# Patient Record
Sex: FEMALE | Race: WHITE | HISPANIC OR LATINO
[De-identification: names, ages, dates, MRNs, and addresses within clinical notes are randomized per-mention and may not be internally consistent; named-entity substitution may affect disease eponyms.]

---

## 2019-11-14 ENCOUNTER — RECORD ABSTRACTING (OUTPATIENT)
Age: 59
End: 2019-11-14

## 2019-11-14 DIAGNOSIS — A15.9 RESPIRATORY TUBERCULOSIS UNSPECIFIED: ICD-10-CM

## 2019-11-14 DIAGNOSIS — E03.9 HYPOTHYROIDISM, UNSPECIFIED: ICD-10-CM

## 2019-11-14 DIAGNOSIS — I87.2 VENOUS INSUFFICIENCY (CHRONIC) (PERIPHERAL): ICD-10-CM

## 2019-11-14 DIAGNOSIS — I73.9 PERIPHERAL VASCULAR DISEASE, UNSPECIFIED: ICD-10-CM

## 2019-11-14 DIAGNOSIS — Z00.00 ENCOUNTER FOR GENERAL ADULT MEDICAL EXAMINATION W/OUT ABNORMAL FINDINGS: ICD-10-CM

## 2019-12-05 ENCOUNTER — APPOINTMENT (OUTPATIENT)
Dept: NEPHROLOGY | Facility: CLINIC | Age: 59
End: 2019-12-05
Payer: COMMERCIAL

## 2019-12-05 VITALS
SYSTOLIC BLOOD PRESSURE: 108 MMHG | DIASTOLIC BLOOD PRESSURE: 68 MMHG | BODY MASS INDEX: 27.6 KG/M2 | WEIGHT: 150 LBS | HEART RATE: 80 BPM | HEIGHT: 62 IN | RESPIRATION RATE: 16 BRPM | OXYGEN SATURATION: 96 %

## 2019-12-05 DIAGNOSIS — Z78.9 OTHER SPECIFIED HEALTH STATUS: ICD-10-CM

## 2019-12-05 DIAGNOSIS — G89.29 OTHER CHRONIC PAIN: ICD-10-CM

## 2019-12-05 PROCEDURE — 99214 OFFICE O/P EST MOD 30 MIN: CPT

## 2019-12-05 RX ORDER — PREDNISONE 10 MG/1
10 TABLET ORAL
Refills: 0 | Status: DISCONTINUED | COMMUNITY
End: 2019-12-05

## 2019-12-05 RX ORDER — GABAPENTIN 300 MG
300 TABLET ORAL 3 TIMES DAILY
Refills: 0 | Status: DISCONTINUED | COMMUNITY
End: 2019-12-05

## 2019-12-05 NOTE — ASSESSMENT
[FreeTextEntry1] : Sara Chin is a 59-year-old female with a history of hypertension, nephrectomy due to tuberculosis, hypothyroidism, and vitamin D deficiency who is here regarding her renal function. Her blood pressures are been well controlled. She had a renal ultrasound in June 2014 which demonstrated an 11 cm right kidney with normal contour and appearance. Her most recent renal ultrasound (06/08/2018) demonstrated an 11.5 cm right kidney with normal echotexture. Her BUN/creatinine levels have trended as follows: 21/0.71 (10/22/2015) --> 16/0.85 (07/01/2016) --> 16/0.73 (10/10/2016) -->11/0.86 (05/10/2017) --> 15/0.7 (09/06/2017) --> 15/0.85 (11/20/2017),\par 13/0.92 (08/28/2018) --> 12/0.78 (11/23/2019). The urinalysis of 11/23/2019 is without blood or albumin on dipstick.  There is a small amount of leukocyte esterase though no bacteria.  . \par \par \par IMPRESSION:\par \par (1) Edema. Lower extremities are bandaged.  I am unsure of what the other vascular issues are.\par (2) Low blood pressure.  Resolved.  The blood pressure is good. \par (3) Lethargy. \par (4) Lower extremity pain. This has been keeping Ms. Chin up at nights.  I am unsure if this is related to  spinal stenosis, peripheral vascular disease, or another etiology.  Ms. Chin has been seeing Dr. Gleason of vascular surgery. \par (5) Stage I CKD. Solitary kidney with a normal serum creatinine.\par (6) Significant sweating at nights. I am note sure what to make of this.\par \par RECOMMENDATIONS:\par \par (1) The lower extremity pain is Ms. Chin' main complaint.  Although I am uncomfortable in treating her with NSAIDS given the solitary kidney, I am also concerned about placing her on narcotics for her pain.  In the short term I told her it was okay to take Aleve at nights for significant pain.  If this is not sufficient, you might consider adding a small dose of tramadol.  I prescribed a one week supply of tramadol.   Ms. Chin might require pain management involvement.  She will be seeing Dr. Gleason next week. \par \par (2) Suggest checking the renal function monthly as long as Ms. Chin is taking NSAIDS.\par \par (3) Increase vitamin D to 2000 IU once daily. \par \par (4) Return in 6 months with a CMP, CBC, phosphorous, 25 hydroxy vitamin D, and iPTH.

## 2019-12-05 NOTE — REVIEW OF SYSTEMS
[Lower Ext Edema] : lower extremity edema [Negative] : Heme/Lymph [FreeTextEntry9] : lower extremity pain

## 2019-12-05 NOTE — CONSULT LETTER
[Dear  ___] : Dear  [unfilled], [Please see my note below.] : Please see my note below. [Consult Letter:] : I had the pleasure of evaluating your patient, [unfilled]. [Consult Closing:] : Thank you very much for allowing me to participate in the care of this patient.  If you have any questions, please do not hesitate to contact me. [Sincerely,] : Sincerely, [FreeTextEntry3] : Dez Montemayor [DrTeresa  ___] : Dr. SENIOR

## 2019-12-05 NOTE — PHYSICAL EXAM
[General Appearance - Alert] : alert [Neck Appearance] : the appearance of the neck was normal [] : no respiratory distress [Respiration, Rhythm And Depth] : normal respiratory rhythm and effort [Exaggerated Use Of Accessory Muscles For Inspiration] : no accessory muscle use [Auscultation Breath Sounds / Voice Sounds] : lungs were clear to auscultation bilaterally [Heart Rate And Rhythm] : heart rate was normal and rhythm regular [Heart Sounds] : normal S1 and S2 [Murmurs] : no murmurs [Heart Sounds Gallop] : no gallops [Heart Sounds Pericardial Friction Rub] : no pericardial rub [Bowel Sounds] : normal bowel sounds [Abdomen Tenderness] : non-tender [Abdomen Soft] : soft [No CVA Tenderness] : no ~M costovertebral angle tenderness [No Spinal Tenderness] : no spinal tenderness [No Focal Deficits] : no focal deficits [Involuntary Movements] : no involuntary movements were seen [Oriented To Time, Place, And Person] : oriented to person, place, and time [Impaired Insight] : insight and judgment were intact [Affect] : the affect was normal [Mood] : the mood was normal [FreeTextEntry1] : lower extremities bandaged

## 2019-12-05 NOTE — HISTORY OF PRESENT ILLNESS
[FreeTextEntry1] : Yoko Chin is a 59-year-old female who was initially referred for a serum creatinine of 1.3 mg/dL.\par Her past medical history is outlined below. She has a history of a solitary kidney following nephrectomy for tuberculosis of the kidney in 2000. She underwent treatment for the tuberculosis at the time.\par \par Ms. Chin is here for follow up. She has been having problems with her lower extremity varicosities.  She has been seeing Dr. Gleason of vascular surgery.  She reports that she has been feeling very tired.

## 2019-12-24 ENCOUNTER — APPOINTMENT (OUTPATIENT)
Dept: NEPHROLOGY | Facility: CLINIC | Age: 59
End: 2019-12-24

## 2020-06-08 ENCOUNTER — APPOINTMENT (OUTPATIENT)
Dept: NEPHROLOGY | Facility: CLINIC | Age: 60
End: 2020-06-08
Payer: COMMERCIAL

## 2020-06-08 DIAGNOSIS — F41.9 ANXIETY DISORDER, UNSPECIFIED: ICD-10-CM

## 2020-06-08 DIAGNOSIS — G47.00 INSOMNIA, UNSPECIFIED: ICD-10-CM

## 2020-06-08 DIAGNOSIS — K21.9 GASTRO-ESOPHAGEAL REFLUX DISEASE W/OUT ESOPHAGITIS: ICD-10-CM

## 2020-06-08 PROCEDURE — 99214 OFFICE O/P EST MOD 30 MIN: CPT | Mod: 95

## 2020-06-08 RX ORDER — PREDNISONE 10 MG/1
10 TABLET ORAL DAILY
Refills: 0 | Status: DISCONTINUED | COMMUNITY
End: 2020-06-08

## 2020-06-08 RX ORDER — TRAMADOL HYDROCHLORIDE 50 MG/1
50 TABLET, COATED ORAL 3 TIMES DAILY
Qty: 21 | Refills: 0 | Status: DISCONTINUED | COMMUNITY
Start: 2019-12-05 | End: 2020-06-08

## 2020-06-08 RX ORDER — GABAPENTIN 300 MG/1
300 CAPSULE ORAL 3 TIMES DAILY
Refills: 0 | Status: DISCONTINUED | COMMUNITY
End: 2020-06-08

## 2020-06-08 RX ORDER — LOSARTAN POTASSIUM 25 MG/1
25 TABLET, FILM COATED ORAL DAILY
Refills: 0 | Status: DISCONTINUED | COMMUNITY
End: 2020-06-08

## 2020-06-08 NOTE — CONSULT LETTER
[Dear  ___] : Dear  [unfilled], [Consult Letter:] : I had the pleasure of evaluating your patient, [unfilled]. [Please see my note below.] : Please see my note below. [Consult Closing:] : Thank you very much for allowing me to participate in the care of this patient.  If you have any questions, please do not hesitate to contact me. [Sincerely,] : Sincerely, [FreeTextEntry3] : Dez Montemayor

## 2020-06-08 NOTE — ASSESSMENT
[FreeTextEntry1] : Sara Chin is a 60-year-old female with a history of hypertension, nephrectomy due to renal tuberculosis, hypothyroidism, and vitamin D deficiency who is here regarding her renal function.  She had a renal ultrasound in June 2014 which demonstrated an 11 cm right kidney with normal contour and appearance. Her most recent renal ultrasound (06/08/2018) demonstrated an 11.5 cm right kidney with normal echotexture. Her BUN/creatinine levels have trended as follows: 21/0.71 (10/22/2015) --> 16/0.85 (07/01/2016) --> 16/0.73 (10/10/2016) -->11/0.86 (05/10/2017) --> 15/0.7 (09/06/2017) --> 15/0.85 (11/20/2017), 13/0.92 (08/28/2018) --> 12/0.78 (11/23/2019) --> 15/0.73 (06/03/2020). The urinalysis of 11/23/2019 is without blood or albumin on dipstick.  \par \par \par IMPRESSION:\par \par (1) Edema. This was noted on the prior visit.  Ms. Chin is to follow up with Dr. Gleason of vascular.\par (2) GERD.\par (3) Insomnia\par (4) Normal renal function\par (5) Vitamin D deficiency. The recent 25 hydroxy vitamin D level was 40.4 ng/mL (normal)\par (6) Anxiety.\par \par RECOMMENDATIONS:\par \par (1) GERD.  Trial of omeprazole 20 mg PO qHS. Ms. Chin should see GI.\par (2) Insomnia.  I suspect that the reflux is contributing to this.\par (3) Anxiety.  Start Lexapro 10 mg PO once daily.  Follow up with PCP re: anxiety. \par (4) Stage I CKD. Solitary kidney with a normal serum creatinine.\par (6) Significant sweating at nights. I am note sure what to make of this.\par \par I will see Ms. Nelson again in one year.  \par \par

## 2020-06-08 NOTE — REVIEW OF SYSTEMS
[Lower Ext Edema] : lower extremity edema [As Noted in HPI] : as noted in HPI [Negative] : Integumentary [FreeTextEntry9] : lower extremity pain  and swelling

## 2020-06-08 NOTE — HISTORY OF PRESENT ILLNESS
[Home] : at home, [unfilled] , at the time of the visit. [Medical Office: ([facility])___] : at the medical office located in  [Verbal consent obtained from patient] : the patient, [unfilled] [FreeTextEntry4] : Chaya Clifton [FreeTextEntry1] : \par Yoko Chin is a 60-year-old female who was initially referred for a serum creatinine of 1.3 mg/dL.\par Her past medical history is outlined below. She has a history of a solitary kidney following nephrectomy for tuberculosis of the kidney in 2000. She underwent treatment for the tuberculosis at the time.\par \par I am visiting with Ms. Chin today via telehealth video conference. .   She has seen  Dr. Gleason of vascular surgery because of her lower extremity edema.   She is planning on seeing him again in July 2020 for a lower extremity doppler.  She saw cardiology for chest pain (Dr. Iyer).  She was told she is okay.  She reports that she wore a cardiac monitor. She is waiting for these results.  She was told she might have reflux and was given Gaviscon.  The symptoms improved with Gaviscon.  A physician covering for her PCP gave her mirtazipine 15 mg PO qHS for sleep. It gave her a bad taste.  She stopped it and started taking melatonin.  When she becomes anxious she takes hydroxyzine.

## 2020-09-10 ENCOUNTER — RX RENEWAL (OUTPATIENT)
Age: 60
End: 2020-09-10

## 2020-10-10 ENCOUNTER — RX RENEWAL (OUTPATIENT)
Age: 60
End: 2020-10-10

## 2021-09-14 ENCOUNTER — APPOINTMENT (OUTPATIENT)
Dept: NEPHROLOGY | Facility: CLINIC | Age: 61
End: 2021-09-14
Payer: COMMERCIAL

## 2021-09-14 VITALS
BODY MASS INDEX: 26.87 KG/M2 | TEMPERATURE: 98 F | OXYGEN SATURATION: 97 % | SYSTOLIC BLOOD PRESSURE: 102 MMHG | HEART RATE: 81 BPM | WEIGHT: 146 LBS | RESPIRATION RATE: 16 BRPM | DIASTOLIC BLOOD PRESSURE: 64 MMHG | HEIGHT: 62 IN

## 2021-09-14 PROCEDURE — 99072 ADDL SUPL MATRL&STAF TM PHE: CPT

## 2021-09-14 PROCEDURE — 99214 OFFICE O/P EST MOD 30 MIN: CPT

## 2021-09-14 RX ORDER — ALPRAZOLAM 0.5 MG/1
0.5 TABLET ORAL
Qty: 6 | Refills: 0 | Status: DISCONTINUED | COMMUNITY
Start: 2020-01-23 | End: 2021-09-14

## 2021-09-14 RX ORDER — HYDROXYZINE HYDROCHLORIDE 25 MG/1
25 TABLET ORAL AT BEDTIME
Refills: 0 | Status: DISCONTINUED | COMMUNITY
End: 2021-09-14

## 2021-09-14 RX ORDER — ESCITALOPRAM OXALATE 10 MG/1
10 TABLET ORAL DAILY
Qty: 30 | Refills: 0 | Status: DISCONTINUED | COMMUNITY
Start: 2020-06-08 | End: 2021-09-14

## 2021-09-14 RX ORDER — MIRTAZAPINE 15 MG/1
15 TABLET, FILM COATED ORAL
Qty: 30 | Refills: 0 | Status: DISCONTINUED | COMMUNITY
Start: 2020-05-20 | End: 2021-09-14

## 2021-09-14 RX ORDER — SILVER SULFADIAZINE 10 G/1000G
1 CREAM TOPICAL
Qty: 50 | Refills: 0 | Status: DISCONTINUED | COMMUNITY
Start: 2019-09-13 | End: 2021-09-14

## 2021-09-14 RX ORDER — ASPIRIN 81 MG
81 TABLET, DELAYED RELEASE (ENTERIC COATED) ORAL DAILY
Refills: 0 | Status: ACTIVE | COMMUNITY

## 2021-09-14 RX ORDER — PREDNISONE 5 MG/1
5 TABLET ORAL
Qty: 21 | Refills: 0 | Status: DISCONTINUED | COMMUNITY
Start: 2020-01-10 | End: 2021-09-14

## 2021-09-14 RX ORDER — TRIAMCINOLONE ACETONIDE 0.25 MG/ML
0.03 LOTION TOPICAL
Qty: 60 | Refills: 0 | Status: DISCONTINUED | COMMUNITY
Start: 2020-02-03 | End: 2021-09-14

## 2021-09-14 NOTE — PHYSICAL EXAM
[General Appearance - Alert] : alert [General Appearance - In No Acute Distress] : in no acute distress [Extraocular Movements] : extraocular movements were intact [Sclera] : the sclera and conjunctiva were normal [Neck Appearance] : the appearance of the neck was normal [] : no respiratory distress [Respiration, Rhythm And Depth] : normal respiratory rhythm and effort [Heart Rate And Rhythm] : heart rate was normal and rhythm regular [Heart Sounds] : normal S1 and S2 [Heart Sounds Gallop] : no gallops [Murmurs] : no murmurs [Heart Sounds Pericardial Friction Rub] : no pericardial rub [Bowel Sounds] : normal bowel sounds [FreeTextEntry1] : bilateral LE 1+ pitting edema [Abdomen Soft] : soft [Abdomen Tenderness] : non-tender [Abnormal Walk] : normal gait [Involuntary Movements] : no involuntary movements were seen [No Focal Deficits] : no focal deficits [Oriented To Time, Place, And Person] : oriented to person, place, and time [Impaired Insight] : insight and judgment were intact [Affect] : the affect was normal [Mood] : the mood was normal

## 2021-09-14 NOTE — HISTORY OF PRESENT ILLNESS
Family notified: Dr. Khalil to update family.   [FreeTextEntry1] : Yoko Chin is a 61-year-old female who was initially referred for a serum creatinine of 1.3 mg/dL.\par Her past medical history is outlined below. She has a history of a solitary kidney following nephrectomy for tuberculosis of the kidney in 2000. She underwent treatment for the tuberculosis at the time.\par \par I last saw Ms. Chin in June of 2020.  At that time she stated that she was planning on seeing Dr. Gleason of vascular surgery because of her lower extremity edema.   She had also seen Dr. Iyer of cardiology for chest pain.  She stated that she was told she is okay.\par \par Ms. Chin is here for follow up. She reports that she had surgery on the right lower extremity.  She feels well and is without complaints.

## 2021-09-14 NOTE — ASSESSMENT
[FreeTextEntry1] : Sara Chin is a 62-year-old female with a history of hypertension, nephrectomy due to renal tuberculosis, hypothyroidism, and vitamin D deficiency who is here regarding her renal function.  She had a renal ultrasound in June 2014 which demonstrated an 11 cm right kidney with normal contour and appearance. Her most recent renal ultrasound (06/08/2018) demonstrated an 11.5 cm right kidney with normal echotexture. Her BUN/creatinine levels have trended as follows: 21/0.71 (10/22/2015) --> 16/0.85 (07/01/2016) --> 16/0.73 (10/10/2016) -->11/0.86 (05/10/2017) --> 15/0.7 (09/06/2017) --> 15/0.85 (11/20/2017), 13/0.92 (08/28/2018) --> 12/0.78 (11/23/2019) --> 15/0.73 (06/03/2020) --> 21/0.97 (08/17/2021).  The urine albumin to creatinine ratio is 3 mcg per mg (normal). \par \par \par IMPRESSION:\par \par (1) Edema. \par (2) Normal renal function.  The renal function is better than when I first saw Ms. Chin.  The serum creatinine has increased slightly since her last visit with me.  She has been using Voltaren cream (an NSAID). \par (3) Vitamin D deficiency. The recent 25 hydroxy vitamin D level was 30 ng/mL.\par (4) Hyperparathyroidism (90 pg/mL).  I suspect this is secondary hyperparathyroidism and related to the borderline 25 hydroxy vitamin D level. \par (5) Hematuria.  The urinalysis demonstrated trace occult blood with 0-2 RBC per high power field.  Ms. Chin reports this has been going on for years.  She has never seen a urologist. \par (6) Hypertension. Well controlled. \par \par RECOMMENDATIONS:\par \par (1) Increase vitamin D to 2000 IU once daily.\par (2) Stop using Voltaren cream.  Avoid using all NSAIDS.  Okay to use Tylenol. \par (3) Continue to stay well hydrated.\par (4) Repeat urinalysis.  Consider a urology visit. \par (5) Continue losartan 50 mg PO once daily. \par \par I will see Ms. Nelson again in 6 months. \par \par

## 2021-09-14 NOTE — REVIEW OF SYSTEMS
[As Noted in HPI] : as noted in HPI [Lower Ext Edema] : lower extremity edema [Negative] : Heme/Lymph [FreeTextEntry8] : nocturia x 2 [FreeTextEntry9] : pain in bilateral hands (pointing to DIP joints).  Right shoulder pain for the past 1 month

## 2022-03-17 DIAGNOSIS — E87.1 HYPO-OSMOLALITY AND HYPONATREMIA: ICD-10-CM

## 2022-04-26 ENCOUNTER — APPOINTMENT (OUTPATIENT)
Dept: NEPHROLOGY | Facility: CLINIC | Age: 62
End: 2022-04-26

## 2022-07-25 ENCOUNTER — APPOINTMENT (OUTPATIENT)
Dept: NEPHROLOGY | Facility: CLINIC | Age: 62
End: 2022-07-25

## 2022-07-25 VITALS
HEART RATE: 76 BPM | DIASTOLIC BLOOD PRESSURE: 70 MMHG | TEMPERATURE: 98 F | SYSTOLIC BLOOD PRESSURE: 110 MMHG | OXYGEN SATURATION: 98 %

## 2022-07-25 DIAGNOSIS — M54.31 SCIATICA, RIGHT SIDE: ICD-10-CM

## 2022-07-25 PROCEDURE — 99214 OFFICE O/P EST MOD 30 MIN: CPT

## 2022-07-25 NOTE — HISTORY OF PRESENT ILLNESS
[FreeTextEntry1] : Yoko Chin is a 62-year-old female who was initially referred for a serum creatinine of 1.3 mg/dL.\par Her past medical history is outlined below. She has a history of a solitary kidney following nephrectomy for tuberculosis of the kidney in 2000. She underwent treatment for the tuberculosis at the time.\par \par I last saw Ms. Chin in September 2021.  Ms. Chin is here for follow up. She has been having sciatica and took 2 Celebrex on a single day.

## 2022-07-25 NOTE — ASSESSMENT
[FreeTextEntry1] : Sara Chin is a 62-year-old female with a history of hypertension, nephrectomy due to renal tuberculosis, hypothyroidism, and vitamin D deficiency who is here regarding her renal function.  She had a renal ultrasound in June 2014 which demonstrated an 11 cm right kidney with normal contour and appearance. Her most recent renal ultrasound (06/08/2018) demonstrated an 11.5 cm right kidney with normal echotexture. Her BUN/creatinine levels have trended as follows: 21/0.71 (10/22/2015) --> 16/0.85 (07/01/2016) --> 16/0.73 (10/10/2016) -->11/0.86 (05/10/2017) --> 15/0.7 (09/06/2017) --> 15/0.85 (11/20/2017), 13/0.92 (08/28/2018) --> 12/0.78 (11/23/2019) --> 15/0.73 (06/03/2020) --> 21/0.97 (08/17/2021) --> 14/0.75 (03/22/2022),   The urine albumin to creatinine ratio is 3 mcg per mg (normal). \par \par \par IMPRESSION/RECOMMENDATIONS:\par \par (1) Sciatica\par -Okay to take Celebrex for no longer than 5 days\par \par (2) Edema\par -Take furosemide 10 mg PO every other day\par \par (3) Normal renal function.  \par \par (4) Vitamin D deficiency. I don't have a recent 25 hydroxy vitamin D level.\par -Suggest checking a 25 hydroxy vitamin D level and treat as needed\par \par (5) Hyperparathyroidism (90 pg/mL).  I suspect this is secondary hyperparathyroidism and related to the borderline 25 hydroxy vitamin D level. \par \par (5) Hematuria.  The urinalysis demonstrated trace occult blood with 0-2 RBC per high power field.  Ms. Chin reports this has been going on for years.  She has never seen a urologist. \par -Suggest urology visit\par \par (6) Hypertension. Well controlled. \par -Continue losartan\par \par Dr. Kang.  Please follow up with Ms. Chin.  She will see me in the future at your request. \par \par \par \par

## 2022-07-25 NOTE — REVIEW OF SYSTEMS
[As Noted in HPI] : as noted in HPI [Lower Ext Edema] : lower extremity edema [Negative] : Heme/Lymph [FreeTextEntry8] : nocturia x 2 [FreeTextEntry9] : right buttock pain with radiations down right leg

## 2022-11-02 ENCOUNTER — RX RENEWAL (OUTPATIENT)
Age: 62
End: 2022-11-02

## 2022-11-17 ENCOUNTER — APPOINTMENT (OUTPATIENT)
Dept: NEPHROLOGY | Facility: CLINIC | Age: 62
End: 2022-11-17

## 2023-06-19 ENCOUNTER — APPOINTMENT (OUTPATIENT)
Dept: NEPHROLOGY | Facility: CLINIC | Age: 63
End: 2023-06-19
Payer: COMMERCIAL

## 2023-06-19 VITALS
HEIGHT: 62 IN | WEIGHT: 147 LBS | HEART RATE: 74 BPM | DIASTOLIC BLOOD PRESSURE: 76 MMHG | SYSTOLIC BLOOD PRESSURE: 118 MMHG | TEMPERATURE: 97.4 F | BODY MASS INDEX: 27.05 KG/M2 | RESPIRATION RATE: 16 BRPM | OXYGEN SATURATION: 97 %

## 2023-06-19 DIAGNOSIS — E55.9 VITAMIN D DEFICIENCY, UNSPECIFIED: ICD-10-CM

## 2023-06-19 DIAGNOSIS — I10 ESSENTIAL (PRIMARY) HYPERTENSION: ICD-10-CM

## 2023-06-19 DIAGNOSIS — L23.7 ALLERGIC CONTACT DERMATITIS DUE TO PLANTS, EXCEPT FOOD: ICD-10-CM

## 2023-06-19 PROCEDURE — 99214 OFFICE O/P EST MOD 30 MIN: CPT

## 2023-06-19 RX ORDER — CHLORHEXIDINE GLUCONATE 4 %
5 LIQUID (ML) TOPICAL DAILY
Refills: 0 | Status: DISCONTINUED | COMMUNITY
End: 2023-06-19

## 2023-06-19 RX ORDER — CELECOXIB 100 MG/1
100 CAPSULE ORAL
Qty: 30 | Refills: 0 | Status: DISCONTINUED | COMMUNITY
Start: 2022-07-12 | End: 2023-06-19

## 2023-06-19 NOTE — ASSESSMENT
[FreeTextEntry1] : Sara Chin is a 62-year-old female with a history of hypertension, nephrectomy due to renal tuberculosis, hypothyroidism, and vitamin D deficiency who is here regarding her renal function.  She had a renal ultrasound in June 2014 which demonstrated an 11 cm right kidney with normal contour and appearance. Her most recent renal ultrasound (06/08/2018) demonstrated an 11.5 cm right kidney with normal echotexture. Her BUN/creatinine levels have trended as follows: 21/0.71 (10/22/2015) --> 16/0.85 (07/01/2016) --> 16/0.73 (10/10/2016) -->11/0.86 (05/10/2017) --> 15/0.7 (09/06/2017) --> 15/0.85 (11/20/2017), 13/0.92 (08/28/2018) --> 12/0.78 (11/23/2019) --> 15/0.73 (06/03/2020) --> 21/0.97 (08/17/2021) --> 14/0.75 (03/22/2022) --> 25/0.89 (06/15/2023).    The urine albumin to creatinine ratio in the past was  3 mcg per mg (normal). \par \par \par IMPRESSION/RECOMMENDATIONS:\par \par (1) Stage II CKD.  Stable. \par \par (2) Edema\par -Controlled on  furosemide 20 mg PO every other day\par \par (3) Poison ivy.  \par -Medrol pack\par \par (4) Vitamin D status\par -Suggest checking a 25 hydroxy vitamin D level and treat as needed\par \par (5) Hyperparathyroidism.  In the past I thought this was due to  (90 pg/mL).  I suspect this is secondary hyperparathyroidism and related to the borderline 25 hydroxy vitamin D level. \par \par (6) Hematuria.  The urinalysis demonstrated trace occult blood with 0-2 RBC per high power field.  Ms. Chin reports this has been going on for years.  \par -Repeat urinalysis\par -Consider urology consult\par \par (7) Hypertension. Well controlled. \par -Continue losartan\par \par \par \par \par

## 2023-06-19 NOTE — PHYSICAL EXAM
[General Appearance - Alert] : alert [General Appearance - In No Acute Distress] : in no acute distress [Sclera] : the sclera and conjunctiva were normal [Extraocular Movements] : extraocular movements were intact [Neck Appearance] : the appearance of the neck was normal [] : no respiratory distress [Respiration, Rhythm And Depth] : normal respiratory rhythm and effort [Heart Rate And Rhythm] : heart rate was normal and rhythm regular [Heart Sounds] : normal S1 and S2 [Heart Sounds Gallop] : no gallops [Murmurs] : no murmurs [Heart Sounds Pericardial Friction Rub] : no pericardial rub [Edema] : there was no peripheral edema [Bowel Sounds] : normal bowel sounds [Abdomen Soft] : soft [Abdomen Tenderness] : non-tender [Abnormal Walk] : normal gait [Involuntary Movements] : no involuntary movements were seen [FreeTextEntry1] : bilateral upper extremity (distal rash) [No Focal Deficits] : no focal deficits [Oriented To Time, Place, And Person] : oriented to person, place, and time [Impaired Insight] : insight and judgment were intact [Affect] : the affect was normal [Mood] : the mood was normal

## 2023-06-19 NOTE — HISTORY OF PRESENT ILLNESS
[FreeTextEntry1] : Yoko Chin is a 63-year-old female who was initially referred for a serum creatinine of 1.3 mg/dL.\par Her past medical history is outlined below. She has a history of a solitary kidney following nephrectomy for tuberculosis of the kidney in 2000. She underwent treatment for the tuberculosis at the time.\par \par This interview was held with the assistance of , Yoko (ID [id number]).\par \par I last saw Ms. Chin in July 2022..  Ms. Chin is here for follow up.  She recently got a case of poison ivy.  She is having itching on her bilateral upper extremities.

## 2024-06-17 ENCOUNTER — APPOINTMENT (OUTPATIENT)
Dept: NEPHROLOGY | Facility: CLINIC | Age: 64
End: 2024-06-17
Payer: COMMERCIAL

## 2024-06-17 VITALS
HEART RATE: 72 BPM | SYSTOLIC BLOOD PRESSURE: 118 MMHG | TEMPERATURE: 98 F | BODY MASS INDEX: 26.68 KG/M2 | WEIGHT: 145 LBS | HEIGHT: 62 IN | RESPIRATION RATE: 16 BRPM | OXYGEN SATURATION: 93 % | DIASTOLIC BLOOD PRESSURE: 62 MMHG

## 2024-06-17 DIAGNOSIS — E21.3 HYPERPARATHYROIDISM, UNSPECIFIED: ICD-10-CM

## 2024-06-17 DIAGNOSIS — Z90.5 ACQUIRED ABSENCE OF KIDNEY: ICD-10-CM

## 2024-06-17 DIAGNOSIS — R60.0 LOCALIZED EDEMA: ICD-10-CM

## 2024-06-17 DIAGNOSIS — N18.9 CHRONIC KIDNEY DISEASE, UNSPECIFIED: ICD-10-CM

## 2024-06-17 DIAGNOSIS — R31.29 OTHER MICROSCOPIC HEMATURIA: ICD-10-CM

## 2024-06-17 PROCEDURE — 99214 OFFICE O/P EST MOD 30 MIN: CPT

## 2024-06-17 RX ORDER — FUROSEMIDE 20 MG/1
20 TABLET ORAL
Qty: 15 | Refills: 0 | Status: DISCONTINUED | COMMUNITY
Start: 2022-07-25 | End: 2024-06-17

## 2024-06-17 RX ORDER — MULTIVIT-MIN/IRON/FOLIC ACID/K 18-600-40
50 MCG CAPSULE ORAL
Refills: 0 | Status: ACTIVE | COMMUNITY

## 2024-06-17 RX ORDER — FUROSEMIDE 20 MG/1
20 TABLET ORAL
Qty: 135 | Refills: 2 | Status: ACTIVE | COMMUNITY
Start: 2024-06-17 | End: 1900-01-01

## 2024-06-17 RX ORDER — LOSARTAN POTASSIUM 50 MG/1
50 TABLET, FILM COATED ORAL
Qty: 30 | Refills: 0 | Status: DISCONTINUED | COMMUNITY
Start: 2020-01-23 | End: 2024-06-17

## 2024-06-17 RX ORDER — FUROSEMIDE 20 MG/1
20 TABLET ORAL DAILY
Refills: 0 | Status: ACTIVE | COMMUNITY

## 2024-06-17 RX ORDER — UBIDECARENONE/VIT E ACET 100MG-5
1000 CAPSULE ORAL
Refills: 0 | Status: DISCONTINUED | COMMUNITY
End: 2024-06-17

## 2024-06-17 RX ORDER — METHYLPREDNISOLONE 4 MG/1
4 TABLET ORAL
Qty: 1 | Refills: 0 | Status: DISCONTINUED | COMMUNITY
Start: 2023-06-19 | End: 2024-06-17

## 2024-06-17 RX ORDER — LOSARTAN POTASSIUM 25 MG/1
25 TABLET, FILM COATED ORAL
Qty: 90 | Refills: 1 | Status: ACTIVE | COMMUNITY

## 2024-06-17 NOTE — HISTORY OF PRESENT ILLNESS
[FreeTextEntry1] : Yoko Chin is a 64-year-old female who was initially referred for a serum creatinine of 1.3 mg/dL. Her past medical history is outlined below. She has a history of a solitary kidney following nephrectomy for tuberculosis of the kidney in 2000. She underwent treatment for the tuberculosis at the time.  This interview was held with the assistance of , Horacio (ID [id number]).  I last saw Ms. Chin in June 2023.  Ms. Chin is here for follow up.  She complains of lower extremity edema. She states she has had a problem with her lower extremity veins for 10-15 years.  She states the swelling "is always the same".  She denies having any dysuria.

## 2024-06-17 NOTE — PHYSICAL EXAM
[General Appearance - Alert] : alert [General Appearance - In No Acute Distress] : in no acute distress [Sclera] : the sclera and conjunctiva were normal [Extraocular Movements] : extraocular movements were intact [Neck Appearance] : the appearance of the neck was normal [Respiration, Rhythm And Depth] : normal respiratory rhythm and effort [Heart Rate And Rhythm] : heart rate was normal and rhythm regular [Heart Sounds] : normal S1 and S2 [Heart Sounds Gallop] : no gallops [Murmurs] : no murmurs [Heart Sounds Pericardial Friction Rub] : no pericardial rub [FreeTextEntry1] : bilateral L > R distal LE pitting edema [Bowel Sounds] : normal bowel sounds [Abdomen Soft] : soft [Abdomen Tenderness] : non-tender [Abnormal Walk] : normal gait [Involuntary Movements] : no involuntary movements were seen [] : no rash [No Focal Deficits] : no focal deficits [Oriented To Time, Place, And Person] : oriented to person, place, and time [Impaired Insight] : insight and judgment were intact [Affect] : the affect was normal [Mood] : the mood was normal [Over the Past 2 Weeks, Have You Felt Down, Depressed, or Hopeless?] : 1.) Over the past 2 weeks, have you felt down, depressed, or hopeless? No [Over the Past 2 Weeks, Have You Felt Little Interest or Pleasure Doing Things?] : 2.) Over the past 2 weeks, have you felt little interest or pleasure doing things? No

## 2024-06-17 NOTE — ASSESSMENT
[FreeTextEntry1] :  Sara Chin is a 62-year-old female with a history of hypertension, nephrectomy due to renal tuberculosis, hypothyroidism, and vitamin D deficiency who is here regarding her renal function. She had a renal ultrasound in June 2014 which demonstrated an 11 cm right kidney with normal contour and appearance. Her most recent renal ultrasound (06/08/2018) demonstrated an 11.5 cm right kidney with normal echotexture. Her BUN/creatinine levels have trended as follows: 21/0.71 (10/22/2015) --> 16/0.85 (07/01/2016) --> 16/0.73 (10/10/2016) -->11/0.86 (05/10/2017) --> 15/0.7 (09/06/2017) --> 15/0.85 (11/20/2017), 13/0.92 (08/28/2018) --> 12/0.78 (11/23/2019) --> 15/0.73 (06/03/2020) --> 21/0.97 (08/17/2021) --> 14/0.75 (03/22/2022) --> 25/0.89 (06/15/2023) --> 16/0.68 (01/26/2024). . The urine albumin to creatinine ratio in the past was 3 mcg per mg (normal).   IMPRESSION/RECOMMENDATIONS:  (1) Stage II CKD. Stable.  (2) Edema -Change furosemide from 20 mg daily to 40 mg on M,W,F, and 20 mg daily other days.  (3) Vitamin D status.  The 25 hydroxy vitamin D level is 53 ng/mL.   (4) Hyperparathyroidism. Intact PTH was 68 pg/mL (01/26/2024).  (5) Hematuria. The urinalysis demonstrated trace occult blood with 10-20 RBC per high power field. Ms. Chin reports this has been going on for years. -Repeat urinalysis -Urology consult  (6) Hypertension. Well controlled. -Continue losartan  Repeat lab studies below in 1 month.  I will call Ms. Chin with the results.  If all is stable, she will see me in 1 year.          .

## 2025-06-16 ENCOUNTER — APPOINTMENT (OUTPATIENT)
Dept: NEPHROLOGY | Facility: CLINIC | Age: 65
End: 2025-06-16
Payer: COMMERCIAL

## 2025-06-16 VITALS
DIASTOLIC BLOOD PRESSURE: 68 MMHG | WEIGHT: 153 LBS | RESPIRATION RATE: 18 BRPM | BODY MASS INDEX: 28.16 KG/M2 | SYSTOLIC BLOOD PRESSURE: 116 MMHG | HEART RATE: 76 BPM | HEIGHT: 62 IN | TEMPERATURE: 97.4 F | OXYGEN SATURATION: 95 %

## 2025-06-16 PROCEDURE — G2211 COMPLEX E/M VISIT ADD ON: CPT

## 2025-06-16 PROCEDURE — 99214 OFFICE O/P EST MOD 30 MIN: CPT

## 2025-06-16 RX ORDER — SIMETHICONE 125 MG/1
125 TABLET, CHEWABLE ORAL
Qty: 180 | Refills: 0 | Status: ACTIVE | COMMUNITY
Start: 2025-06-16 | End: 1900-01-01